# Patient Record
Sex: MALE | Race: WHITE | Employment: FULL TIME | URBAN - METROPOLITAN AREA
[De-identification: names, ages, dates, MRNs, and addresses within clinical notes are randomized per-mention and may not be internally consistent; named-entity substitution may affect disease eponyms.]

---

## 2017-01-25 ENCOUNTER — OFFICE VISIT (OUTPATIENT)
Dept: INTERNAL MEDICINE CLINIC | Facility: CLINIC | Age: 37
End: 2017-01-25

## 2017-01-25 VITALS
WEIGHT: 210 LBS | HEART RATE: 68 BPM | RESPIRATION RATE: 16 BRPM | TEMPERATURE: 99 F | OXYGEN SATURATION: 98 % | SYSTOLIC BLOOD PRESSURE: 118 MMHG | HEIGHT: 70 IN | BODY MASS INDEX: 30.06 KG/M2 | DIASTOLIC BLOOD PRESSURE: 64 MMHG

## 2017-01-25 DIAGNOSIS — J06.9 ACUTE URI: Primary | ICD-10-CM

## 2017-01-25 PROCEDURE — 99203 OFFICE O/P NEW LOW 30 MIN: CPT | Performed by: NURSE PRACTITIONER

## 2017-01-25 RX ORDER — AMOXICILLIN AND CLAVULANATE POTASSIUM 875; 125 MG/1; MG/1
1 TABLET, FILM COATED ORAL 2 TIMES DAILY
Qty: 20 TABLET | Refills: 0 | Status: SHIPPED | OUTPATIENT
Start: 2017-01-25 | End: 2017-02-04

## 2017-01-25 RX ORDER — FLUTICASONE PROPIONATE 50 MCG
1 SPRAY, SUSPENSION (ML) NASAL 2 TIMES DAILY
Qty: 1 BOTTLE | Refills: 0 | Status: SHIPPED | OUTPATIENT
Start: 2017-01-25 | End: 2017-02-01

## 2017-01-25 NOTE — PROGRESS NOTES
Patient presents with:  Cough: symptoms since last night - Nothing OTC   Sore Throat: last night felt like it was closing up but feels a bit better now   Cold: off and on , been ignoring it       HPI:  Presents with 1-2 day history of cough with production bilaterally. No wheezes, rhonchi or rales    A/P:    Acute uri  (primary encounter diagnosis)- Augmentin. Nasal Sinus saline rinses, warm salt water gargles.  Supportive care-increased fluids/rest    Notify office if not improved in 3-4 days or if symptoms even if you feel better. Use Flonase one spray each nostril, twice daily. Morning and evening. Do this after sinus rinses so you don't wash medicine out. All questions were answered and the patient understands the plan.

## 2017-02-08 ENCOUNTER — OFFICE VISIT (OUTPATIENT)
Dept: INTERNAL MEDICINE CLINIC | Facility: CLINIC | Age: 37
End: 2017-02-08

## 2017-02-08 VITALS
BODY MASS INDEX: 29.54 KG/M2 | SYSTOLIC BLOOD PRESSURE: 126 MMHG | HEART RATE: 68 BPM | TEMPERATURE: 98 F | WEIGHT: 211 LBS | DIASTOLIC BLOOD PRESSURE: 80 MMHG | HEIGHT: 71 IN | RESPIRATION RATE: 16 BRPM | OXYGEN SATURATION: 98 %

## 2017-02-08 DIAGNOSIS — J06.9 ACUTE URI: Primary | ICD-10-CM

## 2017-02-08 PROCEDURE — 99213 OFFICE O/P EST LOW 20 MIN: CPT | Performed by: NURSE PRACTITIONER

## 2017-02-08 RX ORDER — AZITHROMYCIN 250 MG/1
TABLET, FILM COATED ORAL
Qty: 6 TABLET | Refills: 0 | Status: SHIPPED | OUTPATIENT
Start: 2017-02-08 | End: 2018-04-23 | Stop reason: ALTCHOICE

## 2017-02-08 NOTE — PATIENT INSTRUCTIONS
Gargle with warm salt water solution 3-5 times daily. Dissolve 1/2 teaspoon salt in half cup of warm tap water. Gargle and spit. Use a humidifier in your room at night.      I highly recommend using a saline nasal wash device such as SinuCleanse Nasal w

## 2017-02-08 NOTE — PROGRESS NOTES
Patient presents with:  Sinus Problem: congestion , post nasal drip, yellow sputum only at night      HPI:  Presents for second visit for URI.  Was seen recently and treated for URI with Augmentin and sinus rinses (stated never did complete sinus rinses as (primary encounter diagnosis)- Suspect residual sinus inflammation and PND from recent URI. Instructed to perform aggressive sinus rinses as previously discussed.  If not improved in 2-3 days take Z-pack as prescribed (provided printed prescription for zikarina All questions were answered and the patient understands the plan.

## 2018-04-23 ENCOUNTER — TELEPHONE (OUTPATIENT)
Dept: INTERNAL MEDICINE CLINIC | Facility: CLINIC | Age: 38
End: 2018-04-23

## 2018-04-23 ENCOUNTER — OFFICE VISIT (OUTPATIENT)
Dept: INTERNAL MEDICINE CLINIC | Facility: CLINIC | Age: 38
End: 2018-04-23

## 2018-04-23 VITALS
SYSTOLIC BLOOD PRESSURE: 124 MMHG | RESPIRATION RATE: 16 BRPM | HEART RATE: 66 BPM | TEMPERATURE: 98 F | OXYGEN SATURATION: 97 % | WEIGHT: 213 LBS | BODY MASS INDEX: 30 KG/M2 | DIASTOLIC BLOOD PRESSURE: 78 MMHG

## 2018-04-23 DIAGNOSIS — R20.2 NUMBNESS AND TINGLING IN RIGHT HAND: ICD-10-CM

## 2018-04-23 DIAGNOSIS — R42 DIZZINESS: Primary | ICD-10-CM

## 2018-04-23 DIAGNOSIS — R20.0 NUMBNESS AND TINGLING IN RIGHT HAND: ICD-10-CM

## 2018-04-23 DIAGNOSIS — Z87.898 HISTORY OF SYNCOPE: ICD-10-CM

## 2018-04-23 PROCEDURE — 99214 OFFICE O/P EST MOD 30 MIN: CPT | Performed by: NURSE PRACTITIONER

## 2018-04-23 NOTE — TELEPHONE ENCOUNTER
Spoke with patient indicating extreme constant dizziness since waking up this morning, visual changes-unable to focus, complains of headache. No CP, No SOB, No ear pressure, ringing or loss of hearing.  No weakness in arms or legs, or any other symptoms at

## 2018-04-23 NOTE — PROGRESS NOTES
Patient presents with:  Dizziness: Room 7 AH- Dizziness since this morning lasting up until before this appt. Had happened a few years ago but stated it being acute - took Ibuprofen       HPI:  Presents with approx 1 day history of dizziness.  Stated this A Constitutional: well developed, well nourished,in no apparent distress  HEENT: Normocephalic and atraumatic. Left TM clear. Right TM clear with fluid, w/o erythema or bulge. Oropharynx is pink and moist without lesions.    Eyes: Conjunctivae are pink and are no Patient Instructions on file for this visit. All questions were answered and the patient understands the plan.

## 2018-04-23 NOTE — TELEPHONE ENCOUNTER
Patient called to make an appointment. He states he is extremely dizzy. He doesn't think he can drive here. His wife will bring him here. Transferring to triage line due to dizziness.

## 2018-04-24 ENCOUNTER — TELEPHONE (OUTPATIENT)
Dept: INTERNAL MEDICINE CLINIC | Facility: CLINIC | Age: 38
End: 2018-04-24

## 2018-04-24 NOTE — TELEPHONE ENCOUNTER
Yes, complete blood work now. Can you please call and try to facilitate earlier appointment. I believe patient may have undiagnosed seizure disorder.  He can see another neurologist in that office if they can see him earlier or even start with APC, just let

## 2018-04-24 NOTE — TELEPHONE ENCOUNTER
MARGE LOV 4/23/18  Pt was told to scheduled appt with Neurology and if Pt was not able to get in before the end of next week, he was to call and let MARGE know. They are scheduling out into mid June. What would be the next step? Should Pt complete BW now?  Or juan j

## 2018-04-25 ENCOUNTER — TELEPHONE (OUTPATIENT)
Dept: SURGERY | Facility: CLINIC | Age: 38
End: 2018-04-25

## 2018-04-25 NOTE — TELEPHONE ENCOUNTER
Pt tried to make appt with Dr. Desi Smith this morning but her first available is not until June 14th. Called pt and gave him first available dates for other providers:  Dr. Salbador Granger, 5/14; [facility], 5/8; or Dr. Abiola Durham, 5/17 in [address] [place] [address].    Dr. Francisco J Ruelas

## 2018-04-25 NOTE — TELEPHONE ENCOUNTER
Spoke with patient, informed have blood work completed now. Patient will call Neurology to try scheduling a sooner appointment with any of the providers and call us back with the appointment information.  Patient verbalized understanding and agreeable to PO

## 2018-04-26 NOTE — TELEPHONE ENCOUNTER
Spoke with pt stating Neuro contacted pt back and scheduled on 5/10/18 with Dr. Mery Reed- updated referral #[id number]. Pt inquiring if ok to keep scheduled appt as is or needs to be seen sooner- if ok, no call back necessary. Please advise.  Thank you

## 2018-04-26 NOTE — TELEPHONE ENCOUNTER
Patient notified ok to see Dr. Adrianne Smith on 5/10/18 as scheduled. Pt reminded to complete bloodwork at [facility]. Pt verbalizes understanding.

## 2018-04-26 NOTE — TELEPHONE ENCOUNTER
That appointment is fine, it is approx 2 weeks away, I think that is acceptable. I am glad they were able to get him in sooner, Dr. Cristina Smith is a fine doctor who can eval him.  Do I need to change referral to Dr. Cristina Smith (originally referred to Dr. Shayna Arguello

## 2018-04-30 NOTE — TELEPHONE ENCOUNTER
MORENA, holding appt on 5/10/18 with Dr. Niko Woods. Pt has not called to confirm he wants the appt.

## 2018-05-10 ENCOUNTER — OFFICE VISIT (OUTPATIENT)
Dept: NEUROLOGY | Facility: CLINIC | Age: 38
End: 2018-05-10

## 2018-05-10 VITALS
DIASTOLIC BLOOD PRESSURE: 69 MMHG | HEART RATE: 85 BPM | HEIGHT: 71 IN | SYSTOLIC BLOOD PRESSURE: 123 MMHG | BODY MASS INDEX: 29.82 KG/M2 | RESPIRATION RATE: 16 BRPM | WEIGHT: 213 LBS

## 2018-05-10 DIAGNOSIS — Z91.89 HISTORY OF FAINTING SPELLS OF UNKNOWN CAUSE: ICD-10-CM

## 2018-05-10 DIAGNOSIS — R42 DIZZINESS: Primary | ICD-10-CM

## 2018-05-10 DIAGNOSIS — H53.8 BLURRY VISION: ICD-10-CM

## 2018-05-10 DIAGNOSIS — R29.818 TRANSIENT NEUROLOGICAL SYMPTOMS: ICD-10-CM

## 2018-05-10 PROCEDURE — 99244 OFF/OP CNSLTJ NEW/EST MOD 40: CPT | Performed by: OTHER

## 2018-05-10 NOTE — H&P
[facility] Patient / Consult Visit    Kev Salas is a 40year old male.                          Referring MD: Adina Lopez    Patient presents with:  Neurologic Problem: C/O of dizziness,numbnness in the right hand ramonita of vision, chest pain, palpitations, shortness of breath, rashes, joint pains, bowel / bladder incontinence or mood issues.      Past Medical History:   Diagnosis Date   • Acute lung injury     punctured lung    • H/O chest tube placement    • Rib fracture Visual fields: Normal  Oculomotor/Trochlear/Abducens:    Eye Movements: EOMI without nystagmus  Trigeminal:   Facial sensation:intact to light touch bilaterally  Facial:   Smile symmetric, eyebrow raise symmetric  Vestibulocochlear:   Hearing: normal bilat vaguely defined \"funny feeling\", further evaluation for epileptiform activity is warranted, will plan for EEG, as well.     At this point, we will continue to monitor, and await additional testing prior to starting any empiric therapy    (R42) Dizziness

## 2018-05-10 NOTE — PATIENT INSTRUCTIONS
Refill policies:    • Allow 2-3 business days for refills; controlled substances may take longer.   • Contact your pharmacy at least 5 days prior to running out of medication and have them send an electronic request or submit request through the “request re entire amount billed. Precertification and Prior Authorizations: If your physician has recommended that you have a procedure or additional testing performed.   [facility]) will contact your insurance carrier to obtain pre-certi cleaned out of your hair with a warm washcloth before you leave. If you like, you can bring a hat to wear after the test or put your hair in a ponytail.    · You will be asked to relax with your eyes closed for a majority of the test and take a nap, if pos

## 2018-06-02 ENCOUNTER — HOSPITAL ENCOUNTER (OUTPATIENT)
Dept: MRI IMAGING | Facility: HOSPITAL | Age: 38
Discharge: HOME | End: 2018-06-02
Attending: Other
Payer: COMMERCIAL

## 2018-06-02 DIAGNOSIS — R29.818 TRANSIENT NEUROLOGICAL SYMPTOMS: ICD-10-CM

## 2018-06-02 DIAGNOSIS — H53.8 BLURRY VISION: ICD-10-CM

## 2018-06-02 DIAGNOSIS — Z91.89 HISTORY OF FAINTING SPELLS OF UNKNOWN CAUSE: ICD-10-CM

## 2018-06-02 DIAGNOSIS — R42 DIZZINESS: ICD-10-CM

## 2018-06-02 PROCEDURE — A9576 INJ PROHANCE MULTIPACK: HCPCS | Performed by: OTHER

## 2018-06-02 PROCEDURE — 70546 MR ANGIOGRAPH HEAD W/O&W/DYE: CPT | Performed by: OTHER

## 2018-06-02 PROCEDURE — 70553 MRI BRAIN STEM W/O & W/DYE: CPT | Performed by: OTHER

## 2018-06-02 PROCEDURE — 70549 MR ANGIOGRAPH NECK W/O&W/DYE: CPT | Performed by: OTHER

## 2018-06-09 ENCOUNTER — NURSE ONLY (OUTPATIENT)
Dept: NEUROLOGY | Facility: CLINIC | Age: 38
End: 2018-06-09

## 2018-06-09 DIAGNOSIS — R29.818 TRANSIENT NEUROLOGICAL SYMPTOMS: ICD-10-CM

## 2018-06-09 DIAGNOSIS — Z91.89 HISTORY OF FAINTING SPELLS OF UNKNOWN CAUSE: Primary | ICD-10-CM

## 2018-06-09 DIAGNOSIS — R42 DIZZINESS: ICD-10-CM

## 2018-06-09 PROCEDURE — 95819 EEG AWAKE AND ASLEEP: CPT | Performed by: OTHER

## 2018-06-18 NOTE — PROCEDURES
[facility] in [place]  in affiliation with [facility]  [address]  [address]  [phone]  Fax [phone]      Name: Eliceo Zhou  5/13/1980  Date recording.      Edgard Waggoner MD, Neurology  [facility]  Pager [phone]

## 2018-06-20 ENCOUNTER — TELEPHONE (OUTPATIENT)
Dept: NEUROLOGY | Facility: CLINIC | Age: 38
End: 2018-06-20

## 2018-06-20 NOTE — TELEPHONE ENCOUNTER
----- Message from Adrian Goodwin MD sent at 6/20/2018  9:55 AM CDT -----  Results noted, EEG normal; let patient know

## 2018-07-02 ENCOUNTER — OFFICE VISIT (OUTPATIENT)
Dept: NEUROLOGY | Facility: CLINIC | Age: 38
End: 2018-07-02

## 2018-07-02 VITALS
RESPIRATION RATE: 16 BRPM | SYSTOLIC BLOOD PRESSURE: 126 MMHG | BODY MASS INDEX: 29.82 KG/M2 | HEIGHT: 71 IN | HEART RATE: 88 BPM | WEIGHT: 213 LBS | DIASTOLIC BLOOD PRESSURE: 76 MMHG

## 2018-07-02 DIAGNOSIS — R29.818 TRANSIENT NEUROLOGICAL SYMPTOMS: Primary | ICD-10-CM

## 2018-07-02 PROCEDURE — 99213 OFFICE O/P EST LOW 20 MIN: CPT | Performed by: OTHER

## 2018-07-02 NOTE — PATIENT INSTRUCTIONS
Follow up in 6 months     Refill policies:    • Allow 2-3 business days for refills; controlled substances may take longer.   • Contact your pharmacy at least 5 days prior to running out of medication and have them send an electronic request or submit reque responsible for the entire amount billed. Precertification and Prior Authorizations: If your physician has recommended that you have a procedure or additional testing performed.   [facility]) will contact your insurance carrier
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
## 2018-12-03 ENCOUNTER — HOSPITAL ENCOUNTER (OUTPATIENT)
Age: 38
Discharge: HOME | End: 2018-12-03
Attending: FAMILY MEDICINE
Payer: COMMERCIAL

## 2018-12-03 VITALS
BODY MASS INDEX: 30 KG/M2 | RESPIRATION RATE: 16 BRPM | HEART RATE: 78 BPM | DIASTOLIC BLOOD PRESSURE: 77 MMHG | SYSTOLIC BLOOD PRESSURE: 137 MMHG | TEMPERATURE: 98 F | WEIGHT: 212.94 LBS | OXYGEN SATURATION: 98 %

## 2018-12-03 DIAGNOSIS — J01.90 ACUTE NON-RECURRENT SINUSITIS, UNSPECIFIED LOCATION: Primary | ICD-10-CM

## 2018-12-03 DIAGNOSIS — J40 BRONCHITIS: ICD-10-CM

## 2018-12-03 PROCEDURE — 99204 OFFICE O/P NEW MOD 45 MIN: CPT

## 2018-12-03 PROCEDURE — 99213 OFFICE O/P EST LOW 20 MIN: CPT

## 2018-12-03 RX ORDER — AZITHROMYCIN 250 MG/1
TABLET, FILM COATED ORAL
Qty: 1 PACKAGE | Refills: 0 | Status: SHIPPED | OUTPATIENT
Start: 2018-12-03 | End: 2018-12-08

## 2018-12-03 RX ORDER — FLUTICASONE PROPIONATE 50 MCG
2 SPRAY, SUSPENSION (ML) NASAL DAILY
Qty: 16 G | Refills: 0 | Status: SHIPPED | OUTPATIENT
Start: 2018-12-03 | End: 2019-01-21 | Stop reason: ALTCHOICE

## 2018-12-03 NOTE — ED INITIAL ASSESSMENT (HPI)
Sinus congestion, post nasal drip & cough on & off x 3 weeks, no relief w OTC cold & sinus. wife recently dx w sinus infection.

## 2018-12-04 NOTE — ED PROVIDER NOTES
Patient Seen in: [facility]    History   Patient presents with:  Cough/URI    Stated Complaint: congestion;cough;sore throat x two weeks    HPI    42-year-old male presents for sinus pressure, congestion and cough.   Patient is clear and moist and mucous membranes are normal.   Eyes: Conjunctivae, EOM and lids are normal. Pupils are equal, round, and reactive to light. Neck: Trachea normal and phonation normal. Neck supple.    Cardiovascular: Normal rate, regular rhythm, S1 n

## 2019-01-21 ENCOUNTER — HOSPITAL ENCOUNTER (OUTPATIENT)
Age: 39
Discharge: HOME | End: 2019-01-21
Attending: FAMILY MEDICINE
Payer: COMMERCIAL

## 2019-01-21 VITALS
OXYGEN SATURATION: 98 % | DIASTOLIC BLOOD PRESSURE: 90 MMHG | SYSTOLIC BLOOD PRESSURE: 137 MMHG | TEMPERATURE: 98 F | RESPIRATION RATE: 16 BRPM | HEART RATE: 89 BPM

## 2019-01-21 DIAGNOSIS — J00 ACUTE NASOPHARYNGITIS: Primary | ICD-10-CM

## 2019-01-21 PROCEDURE — 99213 OFFICE O/P EST LOW 20 MIN: CPT

## 2019-01-21 PROCEDURE — 99214 OFFICE O/P EST MOD 30 MIN: CPT

## 2019-01-21 RX ORDER — CODEINE PHOSPHATE AND GUAIFENESIN 10; 100 MG/5ML; MG/5ML
5 SOLUTION ORAL NIGHTLY PRN
Qty: 100 ML | Refills: 0 | Status: SHIPPED | OUTPATIENT
Start: 2019-01-21 | End: 2021-03-12

## 2019-01-21 RX ORDER — BENZONATATE 100 MG/1
100 CAPSULE ORAL 3 TIMES DAILY PRN
Qty: 30 CAPSULE | Refills: 0 | Status: SHIPPED | OUTPATIENT
Start: 2019-01-21 | End: 2019-02-20

## 2019-01-21 NOTE — ED INITIAL ASSESSMENT (HPI)
Pt presents today with c/o having influenza x 1 week ago. Pt states that he is still having a productive cough but it is better. Pt states that his throat is still sore. Pt denies any fevers.  Pt states that his wife has asthma and came here and was given m

## 2019-01-21 NOTE — ED PROVIDER NOTES
Patient Seen in: [facility]    History   Patient presents with:  Cough/URI  Sore Throat    Stated Complaint: congestion;cough;sore throat x4 days    HPI    49-year-old male status post remote history pneumothorax with rib f clear.   Mouth: MMM. Posterior pharynx is clear. No erythema. No exudate. Uvula is midline. Neck: Supple, NT, FROM. No meningeal signs. LAD: No lymphadenopathy. Heart: S1,S2 RRR, No murmur  Lungs: CTA bilateral. No wheezes rales or rhonchi.   Skin: Warm

## 2021-03-12 ENCOUNTER — OFFICE VISIT (OUTPATIENT)
Dept: FAMILY MEDICINE CLINIC | Facility: CLINIC | Age: 41
End: 2021-03-12
Payer: COMMERCIAL

## 2021-03-12 VITALS
HEART RATE: 82 BPM | SYSTOLIC BLOOD PRESSURE: 120 MMHG | HEIGHT: 70 IN | WEIGHT: 223.38 LBS | RESPIRATION RATE: 16 BRPM | BODY MASS INDEX: 31.98 KG/M2 | DIASTOLIC BLOOD PRESSURE: 80 MMHG | TEMPERATURE: 97 F

## 2021-03-12 DIAGNOSIS — S61.011A LACERATION OF RIGHT THUMB WITHOUT FOREIGN BODY WITHOUT DAMAGE TO NAIL, INITIAL ENCOUNTER: Primary | ICD-10-CM

## 2021-03-12 DIAGNOSIS — L91.8 INFLAMED SKIN TAG: ICD-10-CM

## 2021-03-12 PROCEDURE — 3074F SYST BP LT 130 MM HG: CPT | Performed by: NURSE PRACTITIONER

## 2021-03-12 PROCEDURE — 3008F BODY MASS INDEX DOCD: CPT | Performed by: NURSE PRACTITIONER

## 2021-03-12 PROCEDURE — 11200 RMVL SKIN TAGS UP TO&INC 15: CPT | Performed by: NURSE PRACTITIONER

## 2021-03-12 PROCEDURE — 3079F DIAST BP 80-89 MM HG: CPT | Performed by: NURSE PRACTITIONER

## 2021-03-12 PROCEDURE — 99214 OFFICE O/P EST MOD 30 MIN: CPT | Performed by: NURSE PRACTITIONER

## 2021-03-12 NOTE — PROGRESS NOTES
Patient presents with:  Establish Care: New patient   Skin: has a skin tag he would like removed   Cut: cut thumb would like it checked       HPI:  Presents with approx 3 day history of laceration to right thumb that occurred at work using .  Stat is warm and dry. No rash noted. No erythema. No pallor. Inferior right chin with approx 3mm semi-pedunculated circular non-firm skin tag w/o erythema, edema, TTP, fluctuance, open lesions or drainage.    PROCEDURE  Procedure performed with colleague, Dane Trujillo

## 2021-07-02 ENCOUNTER — HOSPITAL ENCOUNTER (OUTPATIENT)
Age: 41
Discharge: HOME | End: 2021-07-02
Payer: COMMERCIAL

## 2021-07-02 VITALS
OXYGEN SATURATION: 97 % | HEART RATE: 88 BPM | SYSTOLIC BLOOD PRESSURE: 133 MMHG | TEMPERATURE: 98 F | RESPIRATION RATE: 18 BRPM | DIASTOLIC BLOOD PRESSURE: 81 MMHG

## 2021-07-02 DIAGNOSIS — J02.9 ACUTE VIRAL PHARYNGITIS: ICD-10-CM

## 2021-07-02 DIAGNOSIS — J01.00 ACUTE NON-RECURRENT MAXILLARY SINUSITIS: Primary | ICD-10-CM

## 2021-07-02 LAB — S PYO AG THROAT QL: NEGATIVE

## 2021-07-02 PROCEDURE — 99213 OFFICE O/P EST LOW 20 MIN: CPT | Performed by: NURSE PRACTITIONER

## 2021-07-02 PROCEDURE — 87880 STREP A ASSAY W/OPTIC: CPT | Performed by: NURSE PRACTITIONER

## 2021-07-02 RX ORDER — AMOXICILLIN AND CLAVULANATE POTASSIUM 875; 125 MG/1; MG/1
1 TABLET, FILM COATED ORAL 2 TIMES DAILY
Qty: 20 TABLET | Refills: 0 | Status: SHIPPED | OUTPATIENT
Start: 2021-07-02 | End: 2021-07-12

## 2021-07-02 NOTE — ED PROVIDER NOTES
Patient Seen in: Immediate [address] [place] [address]      History   Patient presents with:  Sore Throat    Stated Complaint: Sore Throat; Congestion    HPI/Subjective:   58-year-old male presents to immediate care with sore throat and congestion.   Patient st membrane is bulging. Left Ear: A middle ear effusion is present. Tympanic membrane is bulging. Cardiovascular:      Rate and Rhythm: Normal rate.    Pulmonary:      Effort: Pulmonary effort is normal.   Musculoskeletal:         General: Normal range

## 2022-01-07 ENCOUNTER — IMMUNIZATION (OUTPATIENT)
Dept: LAB | Facility: HOSPITAL | Age: 42
End: 2022-01-07
Attending: EMERGENCY MEDICINE
Payer: COMMERCIAL

## 2022-01-07 DIAGNOSIS — Z23 NEED FOR VACCINATION: Primary | ICD-10-CM

## 2022-01-07 PROCEDURE — 0054A SARSCOV2 VAC 30MCG/0.3ML IM: CPT

## 2022-01-07 PROCEDURE — 0004A SARSCOV2 VAC 30MCG/0.3ML IM: CPT

## 2022-11-17 ENCOUNTER — HOSPITAL ENCOUNTER (OUTPATIENT)
Age: 42
Discharge: HOME | End: 2022-11-17
Payer: COMMERCIAL

## 2022-11-17 ENCOUNTER — APPOINTMENT (OUTPATIENT)
Dept: GENERAL RADIOLOGY | Age: 42
End: 2022-11-17
Attending: PHYSICIAN ASSISTANT
Payer: COMMERCIAL

## 2022-11-17 VITALS
DIASTOLIC BLOOD PRESSURE: 80 MMHG | WEIGHT: 220 LBS | BODY MASS INDEX: 31.5 KG/M2 | SYSTOLIC BLOOD PRESSURE: 125 MMHG | RESPIRATION RATE: 18 BRPM | TEMPERATURE: 98 F | OXYGEN SATURATION: 98 % | HEART RATE: 85 BPM | HEIGHT: 70 IN

## 2022-11-17 DIAGNOSIS — M77.9 TENDINITIS: ICD-10-CM

## 2022-11-17 DIAGNOSIS — M25.532 WRIST PAIN, LEFT: Primary | ICD-10-CM

## 2022-11-17 PROCEDURE — 96372 THER/PROPH/DIAG INJ SC/IM: CPT | Performed by: PHYSICIAN ASSISTANT

## 2022-11-17 PROCEDURE — 73110 X-RAY EXAM OF WRIST: CPT | Performed by: PHYSICIAN ASSISTANT

## 2022-11-17 PROCEDURE — 99213 OFFICE O/P EST LOW 20 MIN: CPT | Performed by: PHYSICIAN ASSISTANT

## 2022-11-17 RX ORDER — METHYLPREDNISOLONE 4 MG/1
TABLET ORAL
Qty: 1 EACH | Refills: 0 | Status: SHIPPED | OUTPATIENT
Start: 2022-11-17

## 2022-11-17 RX ORDER — KETOROLAC TROMETHAMINE 30 MG/ML
30 INJECTION, SOLUTION INTRAMUSCULAR; INTRAVENOUS ONCE
Status: COMPLETED | OUTPATIENT
Start: 2022-11-17 | End: 2022-11-17

## 2022-11-17 RX ORDER — CYCLOBENZAPRINE HCL 10 MG
10 TABLET ORAL NIGHTLY
Qty: 20 TABLET | Refills: 0 | Status: SHIPPED | OUTPATIENT
Start: 2022-11-17

## 2022-11-17 RX ORDER — DEXAMETHASONE 4 MG/1
16 TABLET ORAL ONCE
Status: COMPLETED | OUTPATIENT
Start: 2022-11-17 | End: 2022-11-17

## 2022-11-17 NOTE — DISCHARGE INSTRUCTIONS
Please return to the ER/clinic if symptoms worsen. Follow-up with your PCP in 24-48 hours as needed. The decadron will work in your system the next several days. Will write for some additional prednisone to start on day 2 or 3. Recommend taking naproxen twice daily with food. You may take the muscle relaxer before bed but allow 8 hours for operating machinery. Well-developed Velcro wrist splint is much as possible.   Follow-up appointment with Ortho for further evaluation and treatment if symptoms persist.

## 2022-11-21 ENCOUNTER — OFFICE VISIT (OUTPATIENT)
Dept: INTERNAL MEDICINE CLINIC | Facility: CLINIC | Age: 42
End: 2022-11-21
Payer: COMMERCIAL

## 2022-11-21 VITALS
WEIGHT: 230 LBS | SYSTOLIC BLOOD PRESSURE: 138 MMHG | HEART RATE: 109 BPM | OXYGEN SATURATION: 98 % | HEIGHT: 68.5 IN | BODY MASS INDEX: 34.46 KG/M2 | DIASTOLIC BLOOD PRESSURE: 92 MMHG | RESPIRATION RATE: 18 BRPM

## 2022-11-21 DIAGNOSIS — Z13.220 SCREENING, LIPID: ICD-10-CM

## 2022-11-21 DIAGNOSIS — Z13.228 SCREENING FOR METABOLIC DISORDER: ICD-10-CM

## 2022-11-21 DIAGNOSIS — Z13.0 SCREENING FOR BLOOD DISEASE: ICD-10-CM

## 2022-11-21 DIAGNOSIS — M25.532 LEFT WRIST PAIN: Primary | ICD-10-CM

## 2022-11-21 DIAGNOSIS — Z13.29 SCREENING FOR THYROID DISORDER: ICD-10-CM

## 2022-11-21 DIAGNOSIS — E66.09 CLASS 1 OBESITY DUE TO EXCESS CALORIES WITHOUT SERIOUS COMORBIDITY WITH BODY MASS INDEX (BMI) OF 34.0 TO 34.9 IN ADULT: ICD-10-CM

## 2022-11-21 DIAGNOSIS — Z13.1 SCREENING FOR DIABETES MELLITUS: ICD-10-CM

## 2022-11-21 DIAGNOSIS — R03.0 ELEVATED BLOOD PRESSURE READING: ICD-10-CM

## 2022-11-21 PROCEDURE — 99203 OFFICE O/P NEW LOW 30 MIN: CPT | Performed by: INTERNAL MEDICINE

## 2022-11-21 PROCEDURE — 3080F DIAST BP >= 90 MM HG: CPT | Performed by: INTERNAL MEDICINE

## 2022-11-21 PROCEDURE — 3008F BODY MASS INDEX DOCD: CPT | Performed by: INTERNAL MEDICINE

## 2022-11-21 PROCEDURE — 3075F SYST BP GE 130 - 139MM HG: CPT | Performed by: INTERNAL MEDICINE

## 2023-08-25 ENCOUNTER — LAB ENCOUNTER (OUTPATIENT)
Dept: LAB | Age: 43
End: 2023-08-25
Attending: INTERNAL MEDICINE
Payer: COMMERCIAL

## 2023-08-25 DIAGNOSIS — Z13.0 SCREENING FOR BLOOD DISEASE: ICD-10-CM

## 2023-08-25 DIAGNOSIS — Z13.1 SCREENING FOR DIABETES MELLITUS: ICD-10-CM

## 2023-08-25 DIAGNOSIS — Z13.228 SCREENING FOR METABOLIC DISORDER: ICD-10-CM

## 2023-08-25 DIAGNOSIS — Z13.29 SCREENING FOR THYROID DISORDER: ICD-10-CM

## 2023-08-25 DIAGNOSIS — Z13.220 SCREENING, LIPID: ICD-10-CM

## 2023-08-25 LAB
ALBUMIN SERPL-MCNC: 4.2 G/DL (ref 3.4–5)
ALBUMIN/GLOB SERPL: 1.3 {RATIO} (ref 1–2)
ALP LIVER SERPL-CCNC: 60 U/L
ALT SERPL-CCNC: 45 U/L
ANION GAP SERPL CALC-SCNC: 5 MMOL/L (ref 0–18)
AST SERPL-CCNC: 28 U/L (ref 15–37)
BASOPHILS # BLD AUTO: 0.04 X10(3) UL (ref 0–0.2)
BASOPHILS NFR BLD AUTO: 0.7 %
BILIRUB SERPL-MCNC: 0.6 MG/DL (ref 0.1–2)
BUN BLD-MCNC: 21 MG/DL (ref 7–18)
CALCIUM BLD-MCNC: 9.3 MG/DL (ref 8.5–10.1)
CHLORIDE SERPL-SCNC: 108 MMOL/L (ref 98–112)
CHOLEST SERPL-MCNC: 162 MG/DL (ref ?–200)
CO2 SERPL-SCNC: 24 MMOL/L (ref 21–32)
CREAT BLD-MCNC: 1.4 MG/DL
EGFRCR SERPLBLD CKD-EPI 2021: 64 ML/MIN/1.73M2 (ref 60–?)
EOSINOPHIL # BLD AUTO: 0.26 X10(3) UL (ref 0–0.7)
EOSINOPHIL NFR BLD AUTO: 4.6 %
ERYTHROCYTE [DISTWIDTH] IN BLOOD BY AUTOMATED COUNT: 12.8 %
EST. AVERAGE GLUCOSE BLD GHB EST-MCNC: 117 MG/DL (ref 68–126)
FASTING PATIENT LIPID ANSWER: YES
FASTING STATUS PATIENT QL REPORTED: YES
GLOBULIN PLAS-MCNC: 3.3 G/DL (ref 2.8–4.4)
GLUCOSE BLD-MCNC: 99 MG/DL (ref 70–99)
HBA1C MFR BLD: 5.7 % (ref ?–5.7)
HCT VFR BLD AUTO: 44.8 %
HDLC SERPL-MCNC: 45 MG/DL (ref 40–59)
HGB BLD-MCNC: 14.3 G/DL
IMM GRANULOCYTES # BLD AUTO: 0.01 X10(3) UL (ref 0–1)
IMM GRANULOCYTES NFR BLD: 0.2 %
LDLC SERPL CALC-MCNC: 107 MG/DL (ref ?–100)
LYMPHOCYTES # BLD AUTO: 1.47 X10(3) UL (ref 1–4)
LYMPHOCYTES NFR BLD AUTO: 26.3 %
MCH RBC QN AUTO: 29.5 PG (ref 26–34)
MCHC RBC AUTO-ENTMCNC: 31.9 G/DL (ref 31–37)
MCV RBC AUTO: 92.4 FL
MONOCYTES # BLD AUTO: 0.6 X10(3) UL (ref 0.1–1)
MONOCYTES NFR BLD AUTO: 10.7 %
NEUTROPHILS # BLD AUTO: 3.22 X10 (3) UL (ref 1.5–7.7)
NEUTROPHILS # BLD AUTO: 3.22 X10(3) UL (ref 1.5–7.7)
NEUTROPHILS NFR BLD AUTO: 57.5 %
NONHDLC SERPL-MCNC: 117 MG/DL (ref ?–130)
OSMOLALITY SERPL CALC.SUM OF ELEC: 287 MOSM/KG (ref 275–295)
PLATELET # BLD AUTO: 182 10(3)UL (ref 150–450)
POTASSIUM SERPL-SCNC: 4.9 MMOL/L (ref 3.5–5.1)
PROT SERPL-MCNC: 7.5 G/DL (ref 6.4–8.2)
RBC # BLD AUTO: 4.85 X10(6)UL
SODIUM SERPL-SCNC: 137 MMOL/L (ref 136–145)
TRIGL SERPL-MCNC: 50 MG/DL (ref 30–149)
TSI SER-ACNC: 2.2 MIU/ML (ref 0.36–3.74)
VLDLC SERPL CALC-MCNC: 8 MG/DL (ref 0–30)
WBC # BLD AUTO: 5.6 X10(3) UL (ref 4–11)

## 2023-08-25 PROCEDURE — 80061 LIPID PANEL: CPT | Performed by: INTERNAL MEDICINE

## 2023-08-25 PROCEDURE — 80050 GENERAL HEALTH PANEL: CPT | Performed by: INTERNAL MEDICINE

## 2023-08-25 PROCEDURE — 83036 HEMOGLOBIN GLYCOSYLATED A1C: CPT | Performed by: INTERNAL MEDICINE

## 2023-08-27 DIAGNOSIS — R35.0 URINARY FREQUENCY: Primary | ICD-10-CM

## 2023-09-05 ENCOUNTER — LAB ENCOUNTER (OUTPATIENT)
Dept: LAB | Age: 43
End: 2023-09-05
Attending: INTERNAL MEDICINE
Payer: COMMERCIAL

## 2023-10-09 ENCOUNTER — LAB ENCOUNTER (OUTPATIENT)
Dept: LAB | Age: 43
End: 2023-10-09
Attending: INTERNAL MEDICINE
Payer: COMMERCIAL

## 2023-10-09 DIAGNOSIS — R35.0 URINARY FREQUENCY: ICD-10-CM

## 2023-10-09 LAB
ANION GAP SERPL CALC-SCNC: 6 MMOL/L (ref 0–18)
BILIRUB UR QL STRIP.AUTO: NEGATIVE
BUN BLD-MCNC: 19 MG/DL (ref 7–18)
CALCIUM BLD-MCNC: 9.1 MG/DL (ref 8.5–10.1)
CHLORIDE SERPL-SCNC: 107 MMOL/L (ref 98–112)
CLARITY UR REFRACT.AUTO: CLEAR
CO2 SERPL-SCNC: 25 MMOL/L (ref 21–32)
CREAT BLD-MCNC: 1.24 MG/DL
EGFRCR SERPLBLD CKD-EPI 2021: 74 ML/MIN/1.73M2 (ref 60–?)
FASTING STATUS PATIENT QL REPORTED: YES
GLUCOSE BLD-MCNC: 89 MG/DL (ref 70–99)
GLUCOSE UR STRIP.AUTO-MCNC: NORMAL MG/DL
KETONES UR STRIP.AUTO-MCNC: NEGATIVE MG/DL
LEUKOCYTE ESTERASE UR QL STRIP.AUTO: NEGATIVE
NITRITE UR QL STRIP.AUTO: NEGATIVE
OSMOLALITY SERPL CALC.SUM OF ELEC: 288 MOSM/KG (ref 275–295)
PH UR STRIP.AUTO: 5.5 [PH] (ref 5–8)
POTASSIUM SERPL-SCNC: 4.5 MMOL/L (ref 3.5–5.1)
RBC UR QL AUTO: NEGATIVE
SODIUM SERPL-SCNC: 138 MMOL/L (ref 136–145)
SP GR UR STRIP.AUTO: 1.02 (ref 1–1.03)
UROBILINOGEN UR STRIP.AUTO-MCNC: NORMAL MG/DL

## 2023-10-09 PROCEDURE — 80048 BASIC METABOLIC PNL TOTAL CA: CPT | Performed by: INTERNAL MEDICINE

## 2023-10-09 PROCEDURE — 81003 URINALYSIS AUTO W/O SCOPE: CPT | Performed by: INTERNAL MEDICINE

## 2023-10-13 ENCOUNTER — TELEPHONE (OUTPATIENT)
Dept: INTERNAL MEDICINE CLINIC | Facility: CLINIC | Age: 43
End: 2023-10-13

## 2023-10-13 NOTE — TELEPHONE ENCOUNTER
Future Appointments   Date Time Provider Anthony Lis   11/3/2023  2:40 PM Liza Brown, DO [facility]     Patient is scheduled for Annual Physical.  Please place orders with Selca. Patient aware to fast.  No call back required. Patient informed that labs need to be completed no sooner than 2 weeks prior to the appointment.     Since patient had labs recently, please call patient if MP places orders for his physical.

## 2023-11-03 ENCOUNTER — OFFICE VISIT (OUTPATIENT)
Dept: INTERNAL MEDICINE CLINIC | Facility: CLINIC | Age: 43
End: 2023-11-03
Payer: COMMERCIAL

## 2023-11-03 VITALS
TEMPERATURE: 98 F | SYSTOLIC BLOOD PRESSURE: 112 MMHG | OXYGEN SATURATION: 96 % | DIASTOLIC BLOOD PRESSURE: 68 MMHG | HEIGHT: 68.5 IN | WEIGHT: 226.81 LBS | HEART RATE: 92 BPM | RESPIRATION RATE: 18 BRPM | BODY MASS INDEX: 33.98 KG/M2

## 2023-11-03 DIAGNOSIS — R79.89 ELEVATED SERUM CREATININE: ICD-10-CM

## 2023-11-03 DIAGNOSIS — R73.03 PRE-DIABETES: ICD-10-CM

## 2023-11-03 DIAGNOSIS — Z00.00 WELLNESS EXAMINATION: Primary | ICD-10-CM

## 2023-11-03 DIAGNOSIS — E66.09 CLASS 1 OBESITY DUE TO EXCESS CALORIES WITHOUT SERIOUS COMORBIDITY WITH BODY MASS INDEX (BMI) OF 34.0 TO 34.9 IN ADULT: ICD-10-CM

## 2023-11-03 DIAGNOSIS — E78.00 HYPERCHOLESTEREMIA: ICD-10-CM

## 2023-11-03 DIAGNOSIS — R35.1 NOCTURIA: ICD-10-CM

## 2023-11-03 PROCEDURE — 99396 PREV VISIT EST AGE 40-64: CPT | Performed by: INTERNAL MEDICINE

## 2023-11-03 PROCEDURE — 3078F DIAST BP <80 MM HG: CPT | Performed by: INTERNAL MEDICINE

## 2023-11-03 PROCEDURE — 3074F SYST BP LT 130 MM HG: CPT | Performed by: INTERNAL MEDICINE

## 2023-11-03 PROCEDURE — 3008F BODY MASS INDEX DOCD: CPT | Performed by: INTERNAL MEDICINE

## 2023-11-04 PROBLEM — R73.03 PRE-DIABETES: Status: ACTIVE | Noted: 2023-11-04

## 2023-11-04 PROBLEM — R79.89 ELEVATED SERUM CREATININE: Status: ACTIVE | Noted: 2023-11-04

## 2023-11-04 PROBLEM — E78.00 HYPERCHOLESTEREMIA: Status: ACTIVE | Noted: 2023-11-04

## 2023-11-04 PROBLEM — R42 DIZZINESS: Status: RESOLVED | Noted: 2018-04-23 | Resolved: 2023-11-04

## 2023-11-04 PROBLEM — J06.9 ACUTE URI: Status: RESOLVED | Noted: 2017-01-25 | Resolved: 2023-11-04

## 2023-11-04 PROBLEM — E66.09 CLASS 1 OBESITY DUE TO EXCESS CALORIES WITHOUT SERIOUS COMORBIDITY WITH BODY MASS INDEX (BMI) OF 34.0 TO 34.9 IN ADULT: Status: ACTIVE | Noted: 2023-11-04

## 2025-01-07 NOTE — TELEPHONE ENCOUNTER
Future Appointments   Date Time Provider Department Center   1/20/2025 11:00 AM Bijan Brown, DO [facility]     Orders to edward     Pt informed that labs need to be completed no sooner than 2 weeks prior to the appt. Pt aware to fast-no call back required

## 2025-02-04 NOTE — PROGRESS NOTES
Jassi Tam  5/13/1980    Chief Complaint   Patient presents with    Physical     Pt would like to discuss his Testerone levels and possibly some test. Pt concerned about low testosterone symptoms.      Lab Results     Labs completed on 01/30/2025     SUBJECTIVE   Jassi Tam is a 44 year old male who presents for annual physical examination.    AST 43    A1c 5.7%    Patient has been going to the bathroom more frequently during the day and waking up at night, at least 1-2 times. Denies dysuria, penile discharge.    Interested in having testosterone level checked. Has been feeling more fatigue than normal. Has intact libido but wife has mentioned that his performance has changed.    Review of Systems   Review of Systems   No f/c/chest pain or sob. No cough. No abd pain/n/v/d. No ha or dizziness. No numbness, tingling, or weakness.   OBJECTIVE:   /64 (BP Location: Left arm, Patient Position: Sitting, Cuff Size: large)   Pulse 78   Temp 98.5 °F (36.9 °C) (Temporal)   Resp 18   Ht 5' 9\" (1.753 m)   Wt 226 lb (102.5 kg)   BMI 33.37 kg/m²   Physical Exam   Constitutional: Oriented to person, place, and time. No distress.   HEENT:  Normocephalic and atraumatic.Tympanic membranes appear normal. Oropharynx is clear and moist.   Eyes: Conjunctivae not injected.  Extraocular movements are intact  Neck: Full ROM.  Neck supple.  No thyromegaly  Cardiovascular: Normal rate, regular rhythm and intact distal pulses.  No murmur, rubs or gallops.   Pulmonary/Chest: Effort normal and breath sounds normal. No respiratory distress.  Abdominal: Soft. Bowel sounds are normal. Non tender, no masses, no organomegaly or hernias.  Musculoskeletal: No edema in bilateral lower extremities.  Strength is 5/5 in bilateral upper and lower extremities.  Lymphadenopathy: No cervical adenopathy.   Neurological: No focal neurologic deficits.  Cranial nerves II through XII are intact.  Reflexes are 2+.  Skin:  Skin is warm and dry. No rash on visualized skin.  Psychiatric: Normal mood and affect.     Lab Results   Component Value Date    GLU 89 01/30/2025    BUN 17 01/30/2025    CREATSERUM 1.23 01/30/2025    ANIONGAP 6 01/30/2025    CA 9.5 01/30/2025     01/30/2025    K 4.7 01/30/2025     01/30/2025    CO2 28.0 01/30/2025    OSMOCALC 291 01/30/2025      Lab Results   Component Value Date    WBC 6.3 01/30/2025    RBC 4.88 01/30/2025    HGB 14.3 01/30/2025    HCT 44.1 01/30/2025    MCV 90.4 01/30/2025    MCH 29.3 01/30/2025    MCHC 32.4 01/30/2025    RDW 12.9 01/30/2025    .0 01/30/2025      Lab Results   Component Value Date    TSH 2.329 01/30/2025        ASSESSMENT AND PLAN:       ICD-10-CM    1. Wellness examination  Z00.00       2. Screening for prostate cancer  Z12.5 PSA, Total (Screening) [E]      3. Fatigue, unspecified type  R53.83 Testosterone Total [E]      4. Elevated AST (SGOT)  R74.01 Hepatic Function Panel (7) [E]      5. Urinary frequency  R35.0 Urology Referral - In Network      6. Sexual dysfunction  R37 Testosterone Total [E]      7. Pre-diabetes  R73.03 A1c borderline at 5.7%. Continue lifestyle modifications including diet and exercise.       8. Hypercholesteremia  E78.00 Same as above.          TODAY'S ORDERS     No orders of the defined types were placed in this encounter.      Meds & Refills:  Requested Prescriptions      No prescriptions requested or ordered in this encounter       Imaging & Consults:  None    No follow-ups on file.  There are no Patient Instructions on file for this visit.    All questions were answered and the patient agrees with the plan.     Thank you,  Bijan Brown, DO

## (undated) NOTE — MR AVS SNAPSHOT
[facility] [address] [address] [phone]               Thank you for choosing us for your health care visit with Renato Vang NP.   We are glad to serve you and happy to provide you with this summary Allergies as of Feb 08, 2017     No Known Allergies                Today's Vital Signs     BP Pulse Temp Height Weight BMI    126/80 mmHg 68 98.3 °F (36.8 °C) (Oral) 71\" 211 lb 29.44 kg/m2         Current Medications          This list is accurate as of:

## (undated) NOTE — LETTER
05/10/18    Dear Dr. Margarita Torres      Thank you for referring your patient, Vivek Alford to me for an evaluation. Please see my initial consult note enclosed below. Let me know if you have any questions.     Thank you  Pal Moreira MD, Neuro He denies concussion or head trauma; maternal grandmother with heart disease - adopted and does not know biological father. He is also concerned about stress with his job - admits the day prior to this episode that he had a \"bad day\" at work. LUNGS: clear to auscultation bilaterally  EXTREMITIES: no cyanosis, peripheral pulses intact    Neck: Supple; full range of motion; no carotid bruits    Mental status:  Alert and oriented to time, place, person, and situation  Speech: fluent  Language: nor Patient's description of events, occurring upon awakening, with new onset dizziness, headache, as well as blurry vision, and difficulty understanding reading, may be due to an underlying posterior circulation pathology.   In order to evaluate, will plan for

## (undated) NOTE — MR AVS SNAPSHOT
[facility] [place] [id number] [phone]               Thank you for choosing us for your health care visit with Renato Vang NP.   We are glad to serve you and happy to provide you with this summary feel better. Use Flonase one spray each nostril, twice daily. Morning and evening. Do this after sinus rinses so you don't wash medicine out.                     Allergies as of Jan 25, 2017     No Known Allergies                Today's Vital Signs     B [employer] [address] [place], [place] [address] [address] for making healthy food choices  -   Enjoy your food, but eat less. Fully enjoy your food when eating. Don’t eat while distracted and slow down. Avoid over sized portions. Don’t eat while when you’re bored.